# Patient Record
Sex: MALE | URBAN - METROPOLITAN AREA
[De-identification: names, ages, dates, MRNs, and addresses within clinical notes are randomized per-mention and may not be internally consistent; named-entity substitution may affect disease eponyms.]

---

## 2017-10-24 ENCOUNTER — IMPORTED ENCOUNTER (OUTPATIENT)
Dept: URBAN - METROPOLITAN AREA CLINIC 1 | Facility: CLINIC | Age: 72
End: 2017-10-24

## 2017-10-24 PROBLEM — E11.9: Noted: 2017-10-24

## 2017-10-24 PROBLEM — Z79.84: Noted: 2017-10-24

## 2017-10-24 PROBLEM — H43.21: Noted: 2017-10-24

## 2017-10-24 PROBLEM — H04.123: Noted: 2017-10-24

## 2017-10-24 PROBLEM — H16.143: Noted: 2017-10-24

## 2017-10-24 PROBLEM — H25.813: Noted: 2017-10-24

## 2017-10-24 PROCEDURE — 92014 COMPRE OPH EXAM EST PT 1/>: CPT

## 2017-10-24 PROCEDURE — 92015 DETERMINE REFRACTIVE STATE: CPT

## 2017-10-24 NOTE — PATIENT DISCUSSION
1.  DM Type II without sign of diabetic retinopathy and no blot heme on dilated retinal examination today OU No Macular Edema: Stable. Discussed the pathophysiology of diabetes and its effect on the eye and risk of blindness. Stressed the importance of strong glucose control. Advised of importance of at least yearly dilated examinations but to contact us immediately for any problems or concerns. 2. Type II diabetes controlled by oral medications. 3.  JACQUELINE w/ PEK OU- The use of artificial tears OU to TID were recommended. 4.  Cataract OU: Observe for now without intervention. The patient was advised to contact us if any change or worsening of vision5. Asteroid Hyalosis OD- Stable. Observe. 6. Return for an appointment for a 27 in 1 year with Dr. Amor Martini.

## 2018-10-30 ENCOUNTER — IMPORTED ENCOUNTER (OUTPATIENT)
Dept: URBAN - METROPOLITAN AREA CLINIC 1 | Facility: CLINIC | Age: 73
End: 2018-10-30

## 2018-10-30 PROBLEM — E11.9: Noted: 2018-10-30

## 2018-10-30 PROBLEM — H25.813: Noted: 2018-10-30

## 2018-10-30 PROBLEM — H40.033: Noted: 2018-10-30

## 2018-10-30 PROBLEM — Z79.84: Noted: 2018-10-30

## 2018-10-30 PROBLEM — H43.21: Noted: 2018-10-30

## 2018-10-30 PROBLEM — H16.143: Noted: 2018-10-30

## 2018-10-30 PROBLEM — H04.123: Noted: 2018-10-30

## 2018-10-30 PROCEDURE — 92014 COMPRE OPH EXAM EST PT 1/>: CPT

## 2018-10-30 PROCEDURE — 92015 DETERMINE REFRACTIVE STATE: CPT

## 2018-10-30 NOTE — PATIENT DISCUSSION
1.  DM Type II without sign of diabetic retinopathy and no blot heme on dilated retinal examination today OU No Macular Edema: Stable. Discussed the pathophysiology of diabetes and its effect on the eye and risk of blindness. Stressed the importance of strong glucose control. Advised of importance of at least yearly dilated examinations but to contact us immediately for any problems or concerns. 2. Type II diabetes controlled by oral medications. 3.  Cataract OU:  Visually Significant secondary to glare discussed the risks benefits alternatives and limitations of cataract surgery. The patient stated a full understanding. The patient will need to return for preop appointment with cataract measurements and to have any additional questions answered and start pre-operative eye drops as directed. [id number] [address] for pt to call if/when desires phaco PCL OS then ODOtherwise follow-up in 6 months for a dfe/glare4. JACQUELINE w/ PEK OU- No improvement. The importance of routine artificial tear use discussed and recommended OU BID. 5.  Narrow Angles OU- Observe. 6. Asteroid Hyalosis OD- Stable. Observe. 7. Return for an appointment for jagdish DA SILVA PRN or a dfe/glare in 6 months (pt to decide) with Dr. Barak Velázquez.

## 2019-04-30 ENCOUNTER — IMPORTED ENCOUNTER (OUTPATIENT)
Dept: URBAN - METROPOLITAN AREA CLINIC 1 | Facility: CLINIC | Age: 74
End: 2019-04-30

## 2019-04-30 PROBLEM — Z79.84: Noted: 2019-04-30

## 2019-04-30 PROBLEM — E11.9: Noted: 2019-04-30

## 2019-04-30 PROBLEM — H25.813: Noted: 2019-04-30

## 2019-04-30 PROCEDURE — 92012 INTRM OPH EXAM EST PATIENT: CPT

## 2019-04-30 NOTE — PATIENT DISCUSSION
1.  Cataract OU:  Pt wishes to observe. 2.  DM Type II (Oral Meds) without sign of diabetic retinopathy and no blot heme on dilated retinal examination today OU No Macular Edema:  Discussed the pathophysiology of diabetes and its effect on the eye and risk of blindness. Stressed the importance of strong glucose control. Advised of importance of at least yearly dilated examinations but to contact us immediately for any problems or concerns. 3. JACQUELINE w/ PEK OU- Cont ATs BID OU routinely. 4.  Narrow Angles OU- Observe. 5. Asteroid Hyalosis OD- Stable. Observe. Return for an appointment in 6 mo 30 glare with Dr. Daya Mcallister.

## 2022-04-02 ASSESSMENT — TONOMETRY
OS_IOP_MMHG: 11
OD_IOP_MMHG: 12
OD_IOP_MMHG: 12
OD_IOP_MMHG: 11
OS_IOP_MMHG: 12
OS_IOP_MMHG: 12

## 2022-04-02 ASSESSMENT — VISUAL ACUITY
OD_CC: J1
OS_SC: 20/20-1
OS_GLARE: 20/60
OS_CC: J1
OD_SC: 20/20-2
OD_GLARE: 20/60
OS_SC: 20/25
OS_GLARE: 20/50
OD_GLARE: 20/50
OD_GLARE: 20/80
OD_CC: J1+
OD_SC: 20/30
OS_SC: 20/20
OD_SC: 20/20-2
OS_CC: J1+
OS_GLARE: 20/80